# Patient Record
Sex: FEMALE | Race: WHITE | URBAN - METROPOLITAN AREA
[De-identification: names, ages, dates, MRNs, and addresses within clinical notes are randomized per-mention and may not be internally consistent; named-entity substitution may affect disease eponyms.]

---

## 2018-02-02 NOTE — PATIENT DISCUSSION
POSTERIOR CAPSULAR FIBROSIS, OU:  VISUALLY SIGNIFICANT. SCHEDULE YAG CAPSULOTOMY OD FIRST THEN LATER YAG CAPSULOTOMY OU IF VISUAL SYMPTOMS PERSIST.

## 2020-03-02 ENCOUNTER — IMPORTED ENCOUNTER (OUTPATIENT)
Dept: URBAN - METROPOLITAN AREA CLINIC 50 | Facility: CLINIC | Age: 70
End: 2020-03-02

## 2020-03-03 ENCOUNTER — IMPORTED ENCOUNTER (OUTPATIENT)
Dept: URBAN - METROPOLITAN AREA CLINIC 50 | Facility: CLINIC | Age: 70
End: 2020-03-03

## 2020-03-16 ENCOUNTER — IMPORTED ENCOUNTER (OUTPATIENT)
Dept: URBAN - METROPOLITAN AREA CLINIC 50 | Facility: CLINIC | Age: 70
End: 2020-03-16

## 2021-03-10 ENCOUNTER — IMPORTED ENCOUNTER (OUTPATIENT)
Dept: URBAN - METROPOLITAN AREA CLINIC 50 | Facility: CLINIC | Age: 71
End: 2021-03-10

## 2021-03-16 ENCOUNTER — IMPORTED ENCOUNTER (OUTPATIENT)
Dept: URBAN - METROPOLITAN AREA CLINIC 50 | Facility: CLINIC | Age: 71
End: 2021-03-16

## 2021-03-26 ENCOUNTER — IMPORTED ENCOUNTER (OUTPATIENT)
Dept: URBAN - METROPOLITAN AREA CLINIC 50 | Facility: CLINIC | Age: 71
End: 2021-03-26

## 2021-04-17 ASSESSMENT — VISUAL ACUITY
OS_CC: J1+
OS_CC: 20/20
OD_BAT: 20/60
OD_OTHER: 20/30. 20/30.
OD_CC: J1+
OS_BAT: 20/25
OS_CC: 20/200
OS_OTHER: >20/400.
OD_CC: 20/30+2
OD_BAT: 20/30
OD_CC: 20/20-
OD_CC: J1+ WITH EFFORT@ 16 IN
OD_OTHER: 20/60. 20/100.
OS_BAT: >20/400
OS_OTHER: 20/25. 20/30.
OS_CC: 20/30+2
OS_CC: J1+ WITH EFFORT@ 16 IN
OD_CC: 20/20-2
OS_PH: 20/40

## 2021-04-17 ASSESSMENT — PACHYMETRY
OS_CT_UM: 534
OS_CT_UM: 534
OD_CT_UM: 534
OS_CT_UM: 534
OD_CT_UM: 534
OS_CT_UM: 534

## 2021-04-17 ASSESSMENT — TONOMETRY
OD_IOP_MMHG: 16
OD_IOP_MMHG: 19
OS_IOP_MMHG: 18
OS_IOP_MMHG: 19
OS_IOP_MMHG: 17
OD_IOP_MMHG: 18

## 2022-02-23 NOTE — PATIENT DISCUSSION
Recommended evaluation by oculoplastic specialist for further treatment.  Refer to Dr. Oniel Gutierrez for evaluation & removal.

## 2022-03-02 ENCOUNTER — PREPPED CHART (OUTPATIENT)
Dept: URBAN - METROPOLITAN AREA CLINIC 53 | Facility: CLINIC | Age: 72
End: 2022-03-02

## 2022-03-07 ENCOUNTER — ESTABLISHED PATIENT (OUTPATIENT)
Dept: URBAN - METROPOLITAN AREA CLINIC 53 | Facility: CLINIC | Age: 72
End: 2022-03-07

## 2022-03-07 DIAGNOSIS — H52.4: ICD-10-CM

## 2022-03-07 DIAGNOSIS — H35.3131: ICD-10-CM

## 2022-03-07 DIAGNOSIS — H04.123: ICD-10-CM

## 2022-03-07 DIAGNOSIS — H35.362: ICD-10-CM

## 2022-03-07 DIAGNOSIS — Z97.3: ICD-10-CM

## 2022-03-07 DIAGNOSIS — H40.013: ICD-10-CM

## 2022-03-07 DIAGNOSIS — H25.13: ICD-10-CM

## 2022-03-07 PROCEDURE — 92014 COMPRE OPH EXAM EST PT 1/>: CPT

## 2022-03-07 PROCEDURE — 92015 DETERMINE REFRACTIVE STATE: CPT

## 2022-03-07 PROCEDURE — 92310-3E ESTABLISHED CL PATIENT MULTIFOCAL AND/OR MONOVISION SOFT LENS EVALUATION

## 2022-03-07 PROCEDURE — 92134 CPTRZ OPH DX IMG PST SGM RTA: CPT

## 2022-03-07 ASSESSMENT — VISUAL ACUITY
OS_CC: J1-2
OU_CC: J1+
OS_CC: 20/25
OD_GLARE: 20/25
OS_GLARE: 20/30
OD_CC: J10
OS_GLARE: 20/25
OU_CC: J1+
OD_CC: 20/20
OD_CC: 20/20
OD_GLARE: 20/30
OS_CC: 20/150

## 2022-03-07 ASSESSMENT — TONOMETRY
OD_IOP_MMHG: 16
OS_IOP_MMHG: 17

## 2022-03-08 NOTE — PROCEDURE NOTE: CLINICAL
PROCEDURE NOTE: Excision of Eyelid Lesion Right Lower Lid. Diagnosis: Eyelid Lesion, Uncertain Behavior. Anesthesia: Topical. Prep: Betadine Flush. Prior to treatment, the risks/benefits/alternatives were discussed. The patient wished to proceed with procedure. Local anesthetic was given. The eyelid was prepped and draped for procedure. The lesion was incised and removed. The wound was repaired with sutures. Patient tolerated procedure well. There were no complications. Post procedure instructions given. Tam Sexton

## 2023-04-04 ENCOUNTER — DIAGNOSTICS ONLY (OUTPATIENT)
Dept: URBAN - METROPOLITAN AREA CLINIC 53 | Facility: CLINIC | Age: 73
End: 2023-04-04

## 2023-04-04 DIAGNOSIS — H40.013: ICD-10-CM

## 2023-04-04 PROCEDURE — 92133 CPTRZD OPH DX IMG PST SGM ON: CPT

## 2023-04-04 PROCEDURE — 92083 EXTENDED VISUAL FIELD XM: CPT

## 2024-03-23 NOTE — PATIENT DISCUSSION
Problem: Pain  Goal: Acceptable pain level achieved/maintained at rest using appropriate pain scale for the patient  Outcome: Monitoring/Evaluating progress  Goal: Acceptable pain level achieved/maintained with activity using appropriate pain scale for the patient  Outcome: Monitoring/Evaluating progress  Goal: Acceptable pain level achieved/maintained without oversedation  Outcome: Monitoring/Evaluating progress     Problem: At Risk for Falls  Goal: Patient does not fall  Outcome: Monitoring/Evaluating progress  Goal: Patient takes action to control fall-related risks  Outcome: Monitoring/Evaluating progress     Problem: At Risk for Injury Due to Fall  Goal: Patient does not fall  Outcome: Monitoring/Evaluating progress  Goal: Takes action to control condition specific risks  Outcome: Monitoring/Evaluating progress  Goal: Verbalizes understanding of fall-related injury personal risks  Description: Document education using the patient education activity  Outcome: Monitoring/Evaluating progress      Medications:

## 2024-04-23 ENCOUNTER — COMPREHENSIVE EXAM (OUTPATIENT)
Dept: URBAN - METROPOLITAN AREA CLINIC 53 | Facility: CLINIC | Age: 74
End: 2024-04-23

## 2024-04-23 DIAGNOSIS — H40.013: ICD-10-CM

## 2024-04-23 DIAGNOSIS — H52.4: ICD-10-CM

## 2024-04-23 DIAGNOSIS — H25.812: ICD-10-CM

## 2024-04-23 DIAGNOSIS — H04.123: ICD-10-CM

## 2024-04-23 DIAGNOSIS — H35.3111: ICD-10-CM

## 2024-04-23 DIAGNOSIS — H35.362: ICD-10-CM

## 2024-04-23 DIAGNOSIS — H25.11: ICD-10-CM

## 2024-04-23 DIAGNOSIS — Z97.3: ICD-10-CM

## 2024-04-23 PROCEDURE — 92134 CPTRZ OPH DX IMG PST SGM RTA: CPT

## 2024-04-23 PROCEDURE — 92015 DETERMINE REFRACTIVE STATE: CPT

## 2024-04-23 PROCEDURE — 99214 OFFICE O/P EST MOD 30 MIN: CPT

## 2024-04-23 ASSESSMENT — VISUAL ACUITY
OS_CC: 20/30-1
OS_CC: 20/150
OS_GLARE: 20/30
OD_CC: 20/20-1
OD_CC: 20/20-2
OS_GLARE: 20/50
OD_GLARE: 20/20
OD_GLARE: 20/20

## 2024-04-23 ASSESSMENT — TONOMETRY
OS_IOP_MMHG: 14
OD_IOP_MMHG: 15

## 2024-09-10 ENCOUNTER — DIAGNOSTICS ONLY (OUTPATIENT)
Dept: URBAN - METROPOLITAN AREA CLINIC 53 | Facility: CLINIC | Age: 74
End: 2024-09-10

## 2024-09-10 DIAGNOSIS — H40.013: ICD-10-CM

## 2024-09-10 PROCEDURE — 92133 CPTRZD OPH DX IMG PST SGM ON: CPT

## 2024-09-10 PROCEDURE — 92083 EXTENDED VISUAL FIELD XM: CPT

## 2025-04-29 ENCOUNTER — COMPREHENSIVE EXAM (OUTPATIENT)
Age: 75
End: 2025-04-29

## 2025-04-29 DIAGNOSIS — H40.013: ICD-10-CM

## 2025-04-29 DIAGNOSIS — H04.123: ICD-10-CM

## 2025-04-29 DIAGNOSIS — H35.3111: ICD-10-CM

## 2025-04-29 DIAGNOSIS — Z97.3: ICD-10-CM

## 2025-04-29 DIAGNOSIS — H35.362: ICD-10-CM

## 2025-04-29 DIAGNOSIS — H25.11: ICD-10-CM

## 2025-04-29 DIAGNOSIS — H25.812: ICD-10-CM

## 2025-04-29 PROCEDURE — 92310-1 LEVEL 1 SOFT LENS UPDATE

## 2025-04-29 PROCEDURE — 76514 ECHO EXAM OF EYE THICKNESS: CPT

## 2025-04-29 PROCEDURE — 92134 CPTRZ OPH DX IMG PST SGM RTA: CPT

## 2025-04-29 PROCEDURE — 99214 OFFICE O/P EST MOD 30 MIN: CPT
